# Patient Record
Sex: FEMALE | Race: BLACK OR AFRICAN AMERICAN | URBAN - METROPOLITAN AREA
[De-identification: names, ages, dates, MRNs, and addresses within clinical notes are randomized per-mention and may not be internally consistent; named-entity substitution may affect disease eponyms.]

---

## 2020-12-01 ENCOUNTER — OFFICE VISIT (OUTPATIENT)
Dept: URBAN - METROPOLITAN AREA CLINIC 92 | Facility: CLINIC | Age: 75
End: 2020-12-01
Payer: MEDICARE

## 2020-12-01 DIAGNOSIS — K80.20 GALLSTONES: ICD-10-CM

## 2020-12-01 DIAGNOSIS — R10.13 EPIGASTRIC ABDOMINAL PAIN: ICD-10-CM

## 2020-12-01 PROBLEM — 235919008 GALLSTONES: Status: ACTIVE | Noted: 2020-12-01

## 2020-12-01 PROCEDURE — 74177 CT ABD & PELVIS W/CONTRAST: CPT | Performed by: INTERNAL MEDICINE

## 2020-12-01 PROCEDURE — 99214 OFFICE O/P EST MOD 30 MIN: CPT | Performed by: INTERNAL MEDICINE

## 2020-12-01 RX ORDER — FUROSEMIDE 40 MG/1
TABLET ORAL
Qty: 0 | Refills: 0 | Status: ON HOLD | COMMUNITY
Start: 1900-01-01

## 2020-12-01 RX ORDER — NIFEDIPINE 30 MG/1
TAKE 1 TABLET (30 MG) BY ORAL ROUTE ONCE DAILY TABLET, EXTENDED RELEASE ORAL 1
Qty: 0 | Refills: 0 | Status: ON HOLD | COMMUNITY
Start: 1900-01-01

## 2020-12-01 RX ORDER — ANASTROZOLE 1 MG/1
TAKE 1 TABLET (1 MG) BY ORAL ROUTE ONCE DAILY TABLET ORAL 1
Qty: 0 | Refills: 0 | Status: ON HOLD | COMMUNITY
Start: 1900-01-01

## 2020-12-01 RX ORDER — GABAPENTIN 100 MG/1
TAKE 1 CAPSULE (100 MG) BY ORAL ROUTE ONCE DAILY CAPSULE ORAL 1
Qty: 0 | Refills: 0 | COMMUNITY
Start: 1900-01-01

## 2020-12-01 RX ORDER — ATORVASTATIN CALCIUM 10 MG/1
TAKE 1 TABLET (10 MG) BY ORAL ROUTE ONCE DAILY AT BEDTIME TABLET, FILM COATED ORAL 1
Qty: 0 | Refills: 0 | Status: ON HOLD | COMMUNITY
Start: 1900-01-01

## 2020-12-01 RX ORDER — ACETAMINOPHEN ER 650 MG TABLET,EXTENDED RELEASE 650 MG
TABLET, EXTENDED RELEASE ORAL
Qty: 0 | Refills: 0 | Status: ON HOLD | COMMUNITY
Start: 1900-01-01

## 2020-12-01 NOTE — PHYSICAL EXAM NECK/THYROID:
normal appearance, without tenderness upon palpation, no deformities, no cervical lymphadenopathy, no masses, no thyroid nodules, Thyroid normal size, no JVD, thyroid nontender pt signed out to Dr. Dixon to follow up imaging and admit to tele.  pt to be admitted for transamnititis and presyncopal episodes.

## 2020-12-01 NOTE — HPI-TODAY'S VISIT:
(October 2019) This is a scheduled follow-up appointment for this patient, a 74 year old  female after a previous visit approximately 1 year ago, for an evaluation for peptic ulcer disease. She was found to have an ulcer on EGD one year ago. She was treated with Omeprazole which has relieved her symptoms. She also presents for the follow-up evaluation of GERD. Since the last visit the patient is doing well. There are no new complaints. The present regimen is being followed. She also is being seen in follow up for constipation. Linzess 145 mcg caused diarrhea. The 72 mcg dose she could not afford. Stool evacuation every few days. She avoids red meat and has 2 - 3 servings of fruit and vegetables daily. The patient also presents on referral from Gilma Teixeira MD , for a colonoscopy.  The patient had a previous colonoscopy approximately 6 years ago. It revealed a polyp in the sigmoid colon. The patient reports the risk factors for colon cancer of a history of breast cancer. There is no recent history of rectal bleeding. The patient has no pertinent additional complaints of abdominal pain, constipation, diarrhea, and weight loss.   (Today: December 1, 2020) Ms. Stephens is here complaining of epigastric and pelvic pain.  The epigastric pain developed 1 month ago which she describes as mild burning and discomfort after meals.   She is currently on a PPI once daily S. he denies radiation to the left or right.  The pain is mild.  An ultrasound 2018 revealed gallstones.  She denies diarrhea, dysphagia, nausea, and vomiting.  The pelvic discomfort precedes a bowel movement.  She admits to constipation.  Linzess 145 mcg resulted in diarrhea.  She is now taking Colace 1 tablet daily which relieves her symptoms. The patient states that last month she came to [facility] for routine follow-up with her surgical oncologist.  She went to the hospital cafeteria just prior to her visit and had a chicken salad.  While in the waiting room of the surgical oncologist she developed severe cramping abdominal pain vomiting and diarrhea.  She was transferred to the emergency room where labs and imaging were done.  She was discharged from the ER and diagnosed with gastroenteritis.  Her symptoms resolved after 24 hours.

## 2020-12-01 NOTE — PHYSICAL EXAM GASTROINTESTINAL
Abdomen,  soft, epigastric tenderness, nondistended,  no guarding or rigidity,  no masses palpable,  normal bowel sounds,  Liver and Spleen,  no hepatomegaly present,  no hepatosplenomegaly,  liver nontender,  spleen not palpable,  Self Exam: Abdomen soft, non-tender to palpatation, non-distended

## 2020-12-03 ENCOUNTER — TELEPHONE ENCOUNTER (OUTPATIENT)
Dept: URBAN - METROPOLITAN AREA CLINIC 92 | Facility: CLINIC | Age: 75
End: 2020-12-03

## 2020-12-03 LAB
A/G RATIO: 1.6
ALBUMIN: 4.2
ALKALINE PHOSPHATASE: 126
ALT (SGPT): 20
AST (SGOT): 19
BILIRUBIN, TOTAL: 0.3
BUN/CREATININE RATIO: 23
BUN: 15
CA 19-9: 11
CALCIUM: 9.3
CARBON DIOXIDE, TOTAL: 30
CHLORIDE: 102
CREATININE: 0.64
EGFR IF AFRICN AM: 102
EGFR IF NONAFRICN AM: 89
GLOBULIN, TOTAL: 2.6
GLUCOSE: 115
POTASSIUM: 3.8
PROTEIN, TOTAL: 6.8
SODIUM: 143

## 2021-01-04 ENCOUNTER — TELEPHONE ENCOUNTER (OUTPATIENT)
Dept: URBAN - METROPOLITAN AREA CLINIC 92 | Facility: CLINIC | Age: 76
End: 2021-01-04

## 2021-01-08 ENCOUNTER — TELEPHONE ENCOUNTER (OUTPATIENT)
Dept: URBAN - METROPOLITAN AREA CLINIC 92 | Facility: CLINIC | Age: 76
End: 2021-01-08

## 2021-01-18 ENCOUNTER — TELEPHONE ENCOUNTER (OUTPATIENT)
Dept: URBAN - METROPOLITAN AREA CLINIC 92 | Facility: CLINIC | Age: 76
End: 2021-01-18

## 2022-05-24 ENCOUNTER — WEB ENCOUNTER (OUTPATIENT)
Dept: URBAN - METROPOLITAN AREA CLINIC 92 | Facility: CLINIC | Age: 77
End: 2022-05-24

## 2022-05-24 ENCOUNTER — OFFICE VISIT (OUTPATIENT)
Dept: URBAN - METROPOLITAN AREA CLINIC 92 | Facility: CLINIC | Age: 77
End: 2022-05-24
Payer: MEDICARE

## 2022-05-24 VITALS
HEIGHT: 64 IN | DIASTOLIC BLOOD PRESSURE: 65 MMHG | HEART RATE: 61 BPM | SYSTOLIC BLOOD PRESSURE: 149 MMHG | TEMPERATURE: 98 F | BODY MASS INDEX: 19.63 KG/M2 | WEIGHT: 115 LBS

## 2022-05-24 DIAGNOSIS — R10.2 PELVIC PAIN: ICD-10-CM

## 2022-05-24 PROCEDURE — 99213 OFFICE O/P EST LOW 20 MIN: CPT | Performed by: INTERNAL MEDICINE

## 2022-05-24 RX ORDER — GABAPENTIN 100 MG/1
TAKE 1 CAPSULE (100 MG) BY ORAL ROUTE ONCE DAILY CAPSULE ORAL 1
Qty: 0 | Refills: 0 | Status: ACTIVE | COMMUNITY
Start: 1900-01-01

## 2022-05-24 RX ORDER — NIFEDIPINE 30 MG/1
TAKE 1 TABLET (30 MG) BY ORAL ROUTE ONCE DAILY TABLET, EXTENDED RELEASE ORAL 1
Qty: 0 | Refills: 0 | Status: ACTIVE | COMMUNITY
Start: 1900-01-01

## 2022-05-24 RX ORDER — FUROSEMIDE 40 MG/1
TABLET ORAL
Qty: 0 | Refills: 0 | Status: ACTIVE | COMMUNITY
Start: 1900-01-01

## 2022-05-24 RX ORDER — ATORVASTATIN CALCIUM 10 MG/1
TAKE 1 TABLET (10 MG) BY ORAL ROUTE ONCE DAILY AT BEDTIME TABLET, FILM COATED ORAL 1
Qty: 0 | Refills: 0 | Status: ACTIVE | COMMUNITY
Start: 1900-01-01

## 2022-05-24 NOTE — HPI-TODAY'S VISIT:
This is a 75 yo female here for epigastric pain.  A CT scan of the abdomen January 2021 demonstrated pancreatic atrophy but no ductal dilation or mass.  She is also s/p hysterectomy and appendectomy.  An EGD 2018 demonstrated a gastric ulcer.  She complains of chronic pelvic pain which is relieved  when she has a bowel movement.  This has been an issue for several years.

## 2022-05-24 NOTE — HPI-OTHER HISTORIES
(October 2019) This is a scheduled follow-up appointment for this patient, a 74 year old  female after a previous visit approximately 1 year ago, for an evaluation for peptic ulcer disease. She was found to have an ulcer on EGD one year ago. She was treated with Omeprazole which has relieved her symptoms. She also presents for the follow-up evaluation of GERD. Since the last visit the patient is doing well. There are no new complaints. The present regimen is being followed. She also is being seen in follow up for constipation. Linzess 145 mcg caused diarrhea. The 72 mcg dose she could not afford. Stool evacuation every few days. She avoids red meat and has 2 - 3 servings of fruit and vegetables daily. The patient also presents on referral from Gilma Teixeira MD , for a colonoscopy.  The patient had a previous colonoscopy approximately 6 years ago. It revealed a polyp in the sigmoid colon. The patient reports the risk factors for colon cancer of a history of breast cancer. There is no recent history of rectal bleeding. The patient has no pertinent additional complaints of abdominal pain, constipation, diarrhea, and weight loss.   (December 1, 2020) Ms. Stephens is here complaining of epigastric and pelvic pain.  The epigastric pain developed 1 month ago which she describes as mild burning and discomfort after meals.   She is currently on a PPI once daily S. he denies radiation to the left or right.  The pain is mild.  An ultrasound 2018 revealed gallstones.  She denies diarrhea, dysphagia, nausea, and vomiting.  The pelvic discomfort precedes a bowel movement.  She admits to constipation.  Linzess 145 mcg resulted in diarrhea.  She is now taking Colace 1 tablet daily which relieves her symptoms. The patient states that last month she came to [facility] for routine follow-up with her surgical oncologist.  She went to the hospital cafeteria just prior to her visit and had a chicken salad.  While in the waiting room of the surgical oncologist she developed severe cramping abdominal pain vomiting and diarrhea.  She was transferred to the emergency room where labs and imaging were done.  She was discharged from the ER and diagnosed with gastroenteritis.  Her symptoms resolved after 24 hours.

## 2024-02-14 ENCOUNTER — LAB (OUTPATIENT)
Dept: URBAN - METROPOLITAN AREA CLINIC 17 | Facility: CLINIC | Age: 79
End: 2024-02-14

## 2024-02-14 ENCOUNTER — OV EP (OUTPATIENT)
Dept: URBAN - METROPOLITAN AREA CLINIC 17 | Facility: CLINIC | Age: 79
End: 2024-02-14
Payer: MEDICARE

## 2024-02-14 VITALS
WEIGHT: 206 LBS | BODY MASS INDEX: 35.17 KG/M2 | TEMPERATURE: 97.2 F | SYSTOLIC BLOOD PRESSURE: 146 MMHG | DIASTOLIC BLOOD PRESSURE: 61 MMHG | HEART RATE: 73 BPM | HEIGHT: 64 IN

## 2024-02-14 DIAGNOSIS — R11.2 NAUSEA & VOMITING: ICD-10-CM

## 2024-02-14 DIAGNOSIS — R10.13 EPIGASTRIC ABDOMINAL PAIN: ICD-10-CM

## 2024-02-14 DIAGNOSIS — Z86.010 PERSONAL HISTORY OF COLONIC POLYPS: ICD-10-CM

## 2024-02-14 PROBLEM — 428283002: Status: ACTIVE | Noted: 2024-02-14

## 2024-02-14 PROCEDURE — 99214 OFFICE O/P EST MOD 30 MIN: CPT

## 2024-02-14 RX ORDER — GABAPENTIN 100 MG/1
TAKE 1 CAPSULE (100 MG) BY ORAL ROUTE ONCE DAILY CAPSULE ORAL 1
Qty: 0 | Refills: 0 | Status: ACTIVE | COMMUNITY

## 2024-02-14 RX ORDER — NIFEDIPINE 30 MG/1
TAKE 1 TABLET (30 MG) BY ORAL ROUTE ONCE DAILY TABLET, EXTENDED RELEASE ORAL 1
Qty: 0 | Refills: 0 | Status: ON HOLD | COMMUNITY

## 2024-02-14 RX ORDER — OMEPRAZOLE 20 MG/1
1 CAPSULE 30 MINUTES BEFORE MORNING MEAL CAPSULE, DELAYED RELEASE ORAL ONCE A DAY
Refills: 0 | Status: ACTIVE | COMMUNITY

## 2024-02-14 RX ORDER — FUROSEMIDE 40 MG/1
TABLET ORAL
Qty: 0 | Refills: 0 | Status: ACTIVE | COMMUNITY

## 2024-02-14 RX ORDER — ESCITALOPRAM OXALATE 100 %
1 TABLET POWDER (GRAM) MISCELLANEOUS AS NEEDED
Refills: 0 | Status: ACTIVE | COMMUNITY

## 2024-02-14 RX ORDER — TORSEMIDE 20 MG/1
AS DIRECTED TABLET ORAL ONCE A DAY
Refills: 0 | Status: ACTIVE | COMMUNITY

## 2024-02-14 RX ORDER — SODIUM, POTASSIUM,MAG SULFATES 17.5-3.13G
354ML SOLUTION, RECONSTITUTED, ORAL ORAL 1
Qty: 1 | Refills: 0 | OUTPATIENT
Start: 2024-02-14 | End: 2024-02-15

## 2024-02-14 RX ORDER — OMEPRAZOLE 40 MG/1
1 CAPSULE 30 MINUTES BEFORE MORNING MEAL CAPSULE, DELAYED RELEASE ORAL ONCE A DAY
Qty: 60 | Refills: 0 | OUTPATIENT
Start: 2024-02-14

## 2024-02-14 RX ORDER — ATORVASTATIN CALCIUM 10 MG/1
TAKE 1 TABLET (10 MG) BY ORAL ROUTE ONCE DAILY AT BEDTIME TABLET, FILM COATED ORAL 1
Qty: 0 | Refills: 0 | Status: ACTIVE | COMMUNITY

## 2024-02-14 NOTE — HPI-TODAY'S VISIT:
Established patient of Dr. Bledsoe 79 yo female here for epigastric pain x 4-5 days.  She states she has been taking Omeprazole daily still, unsure what dosage. States it is worse with eating. It does not matter what she eats. She vomited 3 days ago. No hematemesis or coffee ground emesis. Denies dysphaga and melena. Denies NSAID use. Her last EGD in 2018 was significant for a healing ulcer.  She is also due for colon surveillence. She is having BMs every other day. Denies rectal bleeding.

## 2024-02-21 ENCOUNTER — COL/EGD (OUTPATIENT)
Dept: URBAN - METROPOLITAN AREA SURGERY CENTER 30 | Facility: SURGERY CENTER | Age: 79
End: 2024-02-21
Payer: MEDICARE

## 2024-02-21 ENCOUNTER — LAB (OUTPATIENT)
Dept: URBAN - METROPOLITAN AREA CLINIC 4 | Facility: CLINIC | Age: 79
End: 2024-02-21
Payer: MEDICARE

## 2024-02-21 DIAGNOSIS — K31.89 OTHER DISEASES OF STOMACH AND DUODENUM: ICD-10-CM

## 2024-02-21 DIAGNOSIS — K63.89 OTHER SPECIFIED DISEASES OF INTESTINE: ICD-10-CM

## 2024-02-21 DIAGNOSIS — R10.13 ABDOMINAL DISCOMFORT, EPIGASTRIC: ICD-10-CM

## 2024-02-21 DIAGNOSIS — D12.2 ADENOMA OF ASCENDING COLON: ICD-10-CM

## 2024-02-21 DIAGNOSIS — K29.70 GASTRITIS, UNSPECIFIED, WITHOUT BLEEDING: ICD-10-CM

## 2024-02-21 DIAGNOSIS — R11.2 ACUTE NAUSEA WITH NONBILIOUS VOMITING: ICD-10-CM

## 2024-02-21 DIAGNOSIS — K21.9 ACID REFLUX: ICD-10-CM

## 2024-02-21 DIAGNOSIS — Z86.010 ADENOMAS PERSONAL HISTORY OF COLONIC POLYPS: ICD-10-CM

## 2024-02-21 PROCEDURE — 43239 EGD BIOPSY SINGLE/MULTIPLE: CPT | Performed by: INTERNAL MEDICINE

## 2024-02-21 PROCEDURE — 45385 COLONOSCOPY W/LESION REMOVAL: CPT | Performed by: INTERNAL MEDICINE

## 2024-02-21 PROCEDURE — 88305 TISSUE EXAM BY PATHOLOGIST: CPT | Performed by: PATHOLOGY

## 2024-02-21 PROCEDURE — 88312 SPECIAL STAINS GROUP 1: CPT | Performed by: PATHOLOGY

## 2024-02-21 RX ORDER — OMEPRAZOLE 20 MG/1
1 CAPSULE 30 MINUTES BEFORE MORNING MEAL CAPSULE, DELAYED RELEASE ORAL ONCE A DAY
Refills: 0 | Status: ACTIVE | COMMUNITY

## 2024-02-21 RX ORDER — NIFEDIPINE 30 MG/1
TAKE 1 TABLET (30 MG) BY ORAL ROUTE ONCE DAILY TABLET, EXTENDED RELEASE ORAL 1
Qty: 0 | Refills: 0 | Status: ON HOLD | COMMUNITY

## 2024-02-21 RX ORDER — TORSEMIDE 20 MG/1
AS DIRECTED TABLET ORAL ONCE A DAY
Refills: 0 | Status: ACTIVE | COMMUNITY

## 2024-02-21 RX ORDER — OMEPRAZOLE 40 MG/1
TAKE 1 CAPSULE BY MOUTH EVERY DAY 30 MINUTES BEFORE BREAKFAST CAPSULE, DELAYED RELEASE ORAL
Qty: 90 CAPSULE | Refills: 0 | Status: ACTIVE | COMMUNITY

## 2024-02-21 RX ORDER — ESCITALOPRAM OXALATE 100 %
1 TABLET POWDER (GRAM) MISCELLANEOUS AS NEEDED
Refills: 0 | Status: ACTIVE | COMMUNITY

## 2024-02-21 RX ORDER — FUROSEMIDE 40 MG/1
TABLET ORAL
Qty: 0 | Refills: 0 | Status: ACTIVE | COMMUNITY

## 2024-02-21 RX ORDER — ATORVASTATIN CALCIUM 10 MG/1
TAKE 1 TABLET (10 MG) BY ORAL ROUTE ONCE DAILY AT BEDTIME TABLET, FILM COATED ORAL 1
Qty: 0 | Refills: 0 | Status: ACTIVE | COMMUNITY

## 2024-02-21 RX ORDER — GABAPENTIN 100 MG/1
TAKE 1 CAPSULE (100 MG) BY ORAL ROUTE ONCE DAILY CAPSULE ORAL 1
Qty: 0 | Refills: 0 | Status: ACTIVE | COMMUNITY

## 2024-04-12 ENCOUNTER — COL/EGD (OUTPATIENT)
Dept: URBAN - METROPOLITAN AREA SURGERY CENTER 16 | Facility: SURGERY CENTER | Age: 79
End: 2024-04-12

## 2024-05-13 ENCOUNTER — TELEPHONE ENCOUNTER (OUTPATIENT)
Dept: URBAN - METROPOLITAN AREA CLINIC 105 | Facility: CLINIC | Age: 79
End: 2024-05-13

## 2024-05-20 ENCOUNTER — ERX REFILL RESPONSE (OUTPATIENT)
Dept: URBAN - METROPOLITAN AREA CLINIC 17 | Facility: CLINIC | Age: 79
End: 2024-05-20

## 2024-05-20 RX ORDER — OMEPRAZOLE 40 MG/1
TAKE 1 CAPSULE BY MOUTH EVERY DAY 30 MINUTES BEFORE BREAKFAST CAPSULE, DELAYED RELEASE ORAL
Qty: 90 CAPSULE | Refills: 0 | OUTPATIENT

## 2024-05-22 ENCOUNTER — P2P PATIENT RECORD (OUTPATIENT)
Age: 79
End: 2024-05-22

## 2024-08-22 ENCOUNTER — ERX REFILL RESPONSE (OUTPATIENT)
Dept: URBAN - METROPOLITAN AREA CLINIC 17 | Facility: CLINIC | Age: 79
End: 2024-08-22

## 2024-08-22 RX ORDER — OMEPRAZOLE 40 MG/1
TAKE 1 CAPSULE BY MOUTH EVERY DAY 30 MINUTES BEFORE BREAKFAST CAPSULE, DELAYED RELEASE ORAL
Qty: 90 CAPSULE | Refills: 0 | OUTPATIENT

## 2024-11-05 ENCOUNTER — ERX REFILL RESPONSE (OUTPATIENT)
Dept: URBAN - METROPOLITAN AREA CLINIC 17 | Facility: CLINIC | Age: 79
End: 2024-11-05

## 2024-11-05 RX ORDER — OMEPRAZOLE 40 MG/1
TAKE 1 CAPSULE BY MOUTH EVERY DAY 30 MINUTES BEFORE BREAKFAST CAPSULE, DELAYED RELEASE ORAL
Qty: 90 CAPSULE | Refills: 0 | OUTPATIENT

## 2025-03-13 ENCOUNTER — TELEPHONE ENCOUNTER (OUTPATIENT)
Dept: URBAN - METROPOLITAN AREA CLINIC 6 | Facility: CLINIC | Age: 80
End: 2025-03-13

## 2025-05-22 ENCOUNTER — OFFICE VISIT (OUTPATIENT)
Dept: URBAN - METROPOLITAN AREA CLINIC 92 | Facility: CLINIC | Age: 80
End: 2025-05-22

## 2025-05-22 RX ORDER — ESCITALOPRAM OXALATE 100 %
1 TABLET POWDER (GRAM) MISCELLANEOUS AS NEEDED
Refills: 0 | Status: ACTIVE | COMMUNITY

## 2025-05-22 RX ORDER — TORSEMIDE 20 MG/1
AS DIRECTED TABLET ORAL ONCE A DAY
Refills: 0 | Status: ACTIVE | COMMUNITY

## 2025-05-22 RX ORDER — GABAPENTIN 100 MG/1
TAKE 1 CAPSULE (100 MG) BY ORAL ROUTE ONCE DAILY CAPSULE ORAL 1
Qty: 0 | Refills: 0 | Status: ACTIVE | COMMUNITY

## 2025-05-22 RX ORDER — OMEPRAZOLE 20 MG/1
1 CAPSULE 30 MINUTES BEFORE MORNING MEAL CAPSULE, DELAYED RELEASE ORAL ONCE A DAY
Refills: 0 | Status: ACTIVE | COMMUNITY

## 2025-05-22 RX ORDER — FUROSEMIDE 40 MG/1
TABLET ORAL
Qty: 0 | Refills: 0 | Status: ACTIVE | COMMUNITY

## 2025-05-22 RX ORDER — NIFEDIPINE 30 MG/1
TAKE 1 TABLET (30 MG) BY ORAL ROUTE ONCE DAILY TABLET, EXTENDED RELEASE ORAL 1
Qty: 0 | Refills: 0 | Status: ON HOLD | COMMUNITY

## 2025-05-22 RX ORDER — ATORVASTATIN CALCIUM 10 MG/1
TAKE 1 TABLET (10 MG) BY ORAL ROUTE ONCE DAILY AT BEDTIME TABLET, FILM COATED ORAL 1
Qty: 0 | Refills: 0 | Status: ACTIVE | COMMUNITY

## 2025-05-22 RX ORDER — OMEPRAZOLE 40 MG/1
TAKE 1 CAPSULE BY MOUTH EVERY DAY 30 MINUTES BEFORE BREAKFAST CAPSULE, DELAYED RELEASE ORAL
Qty: 90 CAPSULE | Refills: 0 | Status: ACTIVE | COMMUNITY

## 2025-05-28 ENCOUNTER — OFFICE VISIT (OUTPATIENT)
Dept: URBAN - METROPOLITAN AREA CLINIC 92 | Facility: CLINIC | Age: 80
End: 2025-05-28
Payer: COMMERCIAL

## 2025-05-28 ENCOUNTER — DASHBOARD ENCOUNTERS (OUTPATIENT)
Age: 80
End: 2025-05-28

## 2025-05-28 DIAGNOSIS — K21.9 GASTROESOPHAGEAL REFLUX DISEASE, UNSPECIFIED WHETHER ESOPHAGITIS PRESENT: ICD-10-CM

## 2025-05-28 DIAGNOSIS — Z86.0101 PERSONAL HISTORY OF ADENOMATOUS AND SERRATED COLON POLYPS: ICD-10-CM

## 2025-05-28 PROCEDURE — 99213 OFFICE O/P EST LOW 20 MIN: CPT

## 2025-05-28 RX ORDER — GABAPENTIN 100 MG/1
TAKE 1 CAPSULE (100 MG) BY ORAL ROUTE ONCE DAILY CAPSULE ORAL 1
Qty: 0 | Refills: 0 | Status: ACTIVE | COMMUNITY

## 2025-05-28 RX ORDER — ATORVASTATIN CALCIUM 10 MG/1
TAKE 1 TABLET (10 MG) BY ORAL ROUTE ONCE DAILY AT BEDTIME TABLET, FILM COATED ORAL 1
Qty: 0 | Refills: 0 | Status: ACTIVE | COMMUNITY

## 2025-05-28 RX ORDER — OMEPRAZOLE 20 MG/1
1 CAPSULE 30 MINUTES BEFORE MORNING MEAL CAPSULE, DELAYED RELEASE ORAL ONCE A DAY
Refills: 0 | Status: ACTIVE | COMMUNITY

## 2025-05-28 RX ORDER — TORSEMIDE 20 MG/1
AS DIRECTED TABLET ORAL ONCE A DAY
Refills: 0 | Status: ACTIVE | COMMUNITY

## 2025-05-28 RX ORDER — OMEPRAZOLE 40 MG/1
TAKE 1 CAPSULE BY MOUTH EVERY DAY 30 MINUTES BEFORE BREAKFAST CAPSULE, DELAYED RELEASE ORAL
Qty: 90 CAPSULE | Refills: 0 | Status: ACTIVE | COMMUNITY

## 2025-05-28 RX ORDER — NIFEDIPINE 30 MG/1
TAKE 1 TABLET (30 MG) BY ORAL ROUTE ONCE DAILY TABLET, EXTENDED RELEASE ORAL 1
Qty: 0 | Refills: 0 | Status: ACTIVE | COMMUNITY

## 2025-05-28 RX ORDER — FUROSEMIDE 40 MG/1
TABLET ORAL
Qty: 0 | Refills: 0 | Status: ACTIVE | COMMUNITY

## 2025-05-28 RX ORDER — VONOPRAZAN FUMARATE 13.36 MG/1
1 TABLET TABLET ORAL ONCE A DAY
Qty: 90 TABLET | Refills: 3 | OUTPATIENT
Start: 2025-05-29 | End: 2026-05-24

## 2025-05-28 RX ORDER — ESCITALOPRAM OXALATE 100 %
1 TABLET POWDER (GRAM) MISCELLANEOUS AS NEEDED
Refills: 0 | Status: ACTIVE | COMMUNITY

## 2025-05-28 NOTE — HPI-TODAY'S VISIT:
Patient is a 79 year old female who presents for stomach issues. Previously seen by SARAH Sal for abdominal  pain.  2/14/24, She states she has been taking Omeprazole daily still, unsure what dosage. States it is worse with eating. It does not matter what she eats. She vomited 3 days ago. No hematemesis or coffee ground emesis. Denies dysphaga and melena. Denies NSAID use. Her last EGD in 2018 was significant for a healing ulcer.  She is also due for colon surveillence. She is having BMs every other day. Denies rectal bleeding.  Colonoscopy 2/21/24 with Dr. Bledsoe revealed one 10mm, TA polyp in ascending, one 5mm polyp in sigmoid, 3 yr repeat. EGD 2/21/24 revealed a normal esophagus, stomach, and duodenum, antral bx reveals gastropathy, neg. for H. pylori or IM, esophageal bx reveals reflux type changes, neg. for Stoner's or EOE.  Today, patient reports of epigastric and lower abdominal discomfort as previously described. Patient is taking Metamucil most days. She takes MiraLax twice weekly but has to stay close to home. Linzess caused diarrhea. She is trying to eat foods to help her have BM. Did eat fried foods last week that may have precipitated discomfort. Denies hematochezia, melena, or constitutional symptoms. Denies NSAID use. Denies dysphagia, odynophagia, nausea or vomiting. She takes omeprazole daily. Notes her epigastric pain has been present over the past three weeks. No changes in her medications. S/p cholecystectomy. Denies weight or appetite changes. Eats dinner at 8pm and goes to sleep at 10-11pm. She sleeps with her head elevated. Uses a c pap machine. Patient had a recent CT and PET scan of her brain due to memory loss, done at [place].

## 2025-05-29 PROBLEM — 235595009: Status: ACTIVE | Noted: 2025-05-29

## 2025-05-30 ENCOUNTER — TELEPHONE ENCOUNTER (OUTPATIENT)
Dept: URBAN - METROPOLITAN AREA CLINIC 92 | Facility: CLINIC | Age: 80
End: 2025-05-30

## 2025-06-10 ENCOUNTER — TELEPHONE ENCOUNTER (OUTPATIENT)
Dept: URBAN - METROPOLITAN AREA CLINIC 92 | Facility: CLINIC | Age: 80
End: 2025-06-10

## 2025-06-10 RX ORDER — ESOMEPRAZOLE MAGNESIUM 40 MG/1
1 CAPSULE 1/2 TO 1 HOUR BEFORE MORNING MEAL CAPSULE, DELAYED RELEASE PELLETS ORAL ONCE A DAY
Qty: 90 CAPSULE | Refills: 3 | OUTPATIENT
Start: 2025-06-10

## 2025-06-19 ENCOUNTER — OFFICE VISIT (OUTPATIENT)
Dept: URBAN - METROPOLITAN AREA CLINIC 92 | Facility: CLINIC | Age: 80
End: 2025-06-19
Payer: COMMERCIAL

## 2025-06-19 DIAGNOSIS — R10.13 EPIGASTRIC ABDOMINAL PAIN: ICD-10-CM

## 2025-06-19 DIAGNOSIS — Z86.0101 PERSONAL HISTORY OF ADENOMATOUS AND SERRATED COLON POLYPS: ICD-10-CM

## 2025-06-19 DIAGNOSIS — K59.09 CHRONIC CONSTIPATION: ICD-10-CM

## 2025-06-19 PROCEDURE — 99213 OFFICE O/P EST LOW 20 MIN: CPT

## 2025-06-19 RX ORDER — TORSEMIDE 20 MG/1
AS DIRECTED TABLET ORAL ONCE A DAY
Refills: 0 | Status: ACTIVE | COMMUNITY

## 2025-06-19 RX ORDER — GABAPENTIN 100 MG/1
TAKE 1 CAPSULE (100 MG) BY ORAL ROUTE ONCE DAILY CAPSULE ORAL 1
Qty: 0 | Refills: 0 | Status: ACTIVE | COMMUNITY

## 2025-06-19 RX ORDER — NIFEDIPINE 30 MG/1
TAKE 1 TABLET (30 MG) BY ORAL ROUTE ONCE DAILY TABLET, EXTENDED RELEASE ORAL 1
Qty: 0 | Refills: 0 | Status: ACTIVE | COMMUNITY

## 2025-06-19 RX ORDER — ATORVASTATIN CALCIUM 10 MG/1
TAKE 1 TABLET (10 MG) BY ORAL ROUTE ONCE DAILY AT BEDTIME TABLET, FILM COATED ORAL 1
Qty: 0 | Refills: 0 | Status: ACTIVE | COMMUNITY

## 2025-06-19 RX ORDER — ESCITALOPRAM OXALATE 100 %
1 TABLET POWDER (GRAM) MISCELLANEOUS AS NEEDED
Refills: 0 | Status: ACTIVE | COMMUNITY

## 2025-06-19 RX ORDER — ESOMEPRAZOLE MAGNESIUM 40 MG/1
1 CAPSULE 1/2 TO 1 HOUR BEFORE MORNING MEAL CAPSULE, DELAYED RELEASE PELLETS ORAL ONCE A DAY
Qty: 90 CAPSULE | Refills: 3 | Status: ACTIVE | COMMUNITY
Start: 2025-06-10

## 2025-06-19 RX ORDER — FUROSEMIDE 40 MG/1
TABLET ORAL
Qty: 0 | Refills: 0 | Status: ACTIVE | COMMUNITY

## 2025-06-19 NOTE — HPI-TODAY'S VISIT:
Patient is a 79 year old female who presents for stomach issues. Previously seen by SARAH Sal for abdominal  pain.  2/14/24, She states she has been taking Omeprazole daily still, unsure what dosage. States it is worse with eating. It does not matter what she eats. She vomited 3 days ago. No hematemesis or coffee ground emesis. Denies dysphaga and melena. Denies NSAID use. Her last EGD in 2018 was significant for a healing ulcer.  She is also due for colon surveillence. She is having BMs every other day. Denies rectal bleeding.  Colonoscopy 2/21/24 with Dr. Bledsoe revealed one 10mm, TA polyp in ascending, one 5mm polyp in sigmoid, 3 yr repeat. EGD 2/21/24 revealed a normal esophagus, stomach, and duodenum, antral bx reveals gastropathy, neg. for H. pylori or IM, esophageal bx reveals reflux type changes, neg. for Stoner's or EOE.  5/28/25, patient reports of epigastric and lower abdominal discomfort as previously described. Patient is taking Metamucil most days. She takes MiraLax twice weekly but has to stay close to home. Linzess caused diarrhea. She is trying to eat foods to help her have BM. Did eat fried foods last week that may have precipitated discomfort. Denies hematochezia, melena, or constitutional symptoms. Denies NSAID use. Denies dysphagia, odynophagia, nausea or vomiting. She takes omeprazole daily. Notes her epigastric pain has been present over the past three weeks. No changes in her medications. S/p cholecystectomy. Denies weight or appetite changes. Eats dinner at 8pm and goes to sleep at 10-11pm. She sleeps with her head elevated. Uses a c pap machine. Patient had a recent CT and PET scan of her brain due to memory loss, done at [place].  Today, patient reports she continues to experience constant and dull, epigastric discomfort that is worse after eating. Denies other upper GI issues. She is c/w esomeprazole daily, Voquezna was too expensive but did not notice a difference when she took the samples. Patient notes she does struggle with constipation. She notes a full dose of MiraLax is too strong. Linzess caused diarrhea. Denies hematochezia, weight, or appetite changes. Patient uses a cpap machine. Patient has CHF, she sees Dr. Addy Hernandes. Patient denies kidney issues. No pacemaker/defibrillator, No blood thinner, No home O2. No dialysis.

## 2025-07-18 ENCOUNTER — TELEPHONE ENCOUNTER (OUTPATIENT)
Dept: URBAN - METROPOLITAN AREA CLINIC 92 | Facility: CLINIC | Age: 80
End: 2025-07-18

## 2025-07-21 ENCOUNTER — TELEPHONE ENCOUNTER (OUTPATIENT)
Dept: URBAN - METROPOLITAN AREA CLINIC 92 | Facility: CLINIC | Age: 80
End: 2025-07-21

## 2025-07-24 ENCOUNTER — TELEPHONE ENCOUNTER (OUTPATIENT)
Dept: URBAN - METROPOLITAN AREA CLINIC 92 | Facility: CLINIC | Age: 80
End: 2025-07-24

## 2025-07-28 ENCOUNTER — TELEPHONE ENCOUNTER (OUTPATIENT)
Dept: URBAN - METROPOLITAN AREA CLINIC 92 | Facility: CLINIC | Age: 80
End: 2025-07-28

## 2025-08-08 ENCOUNTER — TELEPHONE ENCOUNTER (OUTPATIENT)
Dept: URBAN - METROPOLITAN AREA CLINIC 92 | Facility: CLINIC | Age: 80
End: 2025-08-08

## 2025-08-13 ENCOUNTER — TELEPHONE ENCOUNTER (OUTPATIENT)
Dept: URBAN - METROPOLITAN AREA CLINIC 92 | Facility: CLINIC | Age: 80
End: 2025-08-13

## 2025-08-26 ENCOUNTER — TELEPHONE ENCOUNTER (OUTPATIENT)
Dept: URBAN - METROPOLITAN AREA CLINIC 92 | Facility: CLINIC | Age: 80
End: 2025-08-26